# Patient Record
Sex: FEMALE | URBAN - METROPOLITAN AREA
[De-identification: names, ages, dates, MRNs, and addresses within clinical notes are randomized per-mention and may not be internally consistent; named-entity substitution may affect disease eponyms.]

---

## 2023-03-13 ENCOUNTER — APPOINTMENT (RX ONLY)
Dept: URBAN - METROPOLITAN AREA CLINIC 152 | Facility: CLINIC | Age: 43
Setting detail: DERMATOLOGY
End: 2023-03-13

## 2023-03-13 DIAGNOSIS — Q828 OTHER SPECIFIED ANOMALIES OF SKIN: ICD-10-CM

## 2023-03-13 DIAGNOSIS — L82.1 OTHER SEBORRHEIC KERATOSIS: ICD-10-CM

## 2023-03-13 DIAGNOSIS — L30.9 DERMATITIS, UNSPECIFIED: ICD-10-CM

## 2023-03-13 DIAGNOSIS — Q819 OTHER SPECIFIED ANOMALIES OF SKIN: ICD-10-CM

## 2023-03-13 DIAGNOSIS — L73.8 OTHER SPECIFIED FOLLICULAR DISORDERS: ICD-10-CM

## 2023-03-13 DIAGNOSIS — Q826 OTHER SPECIFIED ANOMALIES OF SKIN: ICD-10-CM

## 2023-03-13 DIAGNOSIS — D22 MELANOCYTIC NEVI: ICD-10-CM

## 2023-03-13 DIAGNOSIS — L57.8 OTHER SKIN CHANGES DUE TO CHRONIC EXPOSURE TO NONIONIZING RADIATION: ICD-10-CM

## 2023-03-13 PROBLEM — D22.5 MELANOCYTIC NEVI OF TRUNK: Status: ACTIVE | Noted: 2023-03-13

## 2023-03-13 PROBLEM — L85.8 OTHER SPECIFIED EPIDERMAL THICKENING: Status: ACTIVE | Noted: 2023-03-13

## 2023-03-13 PROCEDURE — ? TREATMENT REGIMEN

## 2023-03-13 PROCEDURE — ? COUNSELING

## 2023-03-13 PROCEDURE — 99203 OFFICE O/P NEW LOW 30 MIN: CPT

## 2023-03-13 ASSESSMENT — LOCATION SIMPLE DESCRIPTION DERM
LOCATION SIMPLE: LEFT POSTERIOR UPPER ARM
LOCATION SIMPLE: LEFT ANTERIOR NECK
LOCATION SIMPLE: RIGHT POSTERIOR UPPER ARM
LOCATION SIMPLE: RIGHT CHEEK
LOCATION SIMPLE: LEFT CHEEK
LOCATION SIMPLE: LEFT UPPER BACK
LOCATION SIMPLE: UPPER BACK

## 2023-03-13 ASSESSMENT — LOCATION ZONE DERM
LOCATION ZONE: NECK
LOCATION ZONE: FACE
LOCATION ZONE: TRUNK
LOCATION ZONE: ARM

## 2023-03-13 ASSESSMENT — LOCATION DETAILED DESCRIPTION DERM
LOCATION DETAILED: RIGHT DISTAL POSTERIOR UPPER ARM
LOCATION DETAILED: LEFT INFERIOR ANTERIOR NECK
LOCATION DETAILED: LEFT SUPERIOR UPPER BACK
LOCATION DETAILED: RIGHT SUPERIOR MEDIAL MALAR CHEEK
LOCATION DETAILED: LEFT CENTRAL MALAR CHEEK
LOCATION DETAILED: INFERIOR THORACIC SPINE
LOCATION DETAILED: LEFT DISTAL POSTERIOR UPPER ARM

## 2023-03-13 NOTE — PROCEDURE: COUNSELING
Detail Level: Detailed
Sunscreen Recommendations: - Rec SPF50+ daily, reapply every 2h
Detail Level: Generalized
Patient Specific Counseling (Will Not Stick From Patient To Patient): -discussed that oculoplastic surgeon could remove SK near right eye
Detail Level: Zone
Patient Specific Counseling (Will Not Stick From Patient To Patient): -advised pt to moisturize QD and avoid overly-drying body wash\\n-recommended Eucerin creamy body wash and Eucerin Advanced Repair Cream\\n-discussed that moisturizers with urea can help resolve roughness

## 2023-03-13 NOTE — HPI: OTHER
Condition:: FBSE
Please Describe Your Condition:: Pt notes lesions of concern below the right eye and on the left cheek.

## 2024-08-06 ENCOUNTER — APPOINTMENT (RX ONLY)
Dept: URBAN - METROPOLITAN AREA CLINIC 152 | Facility: CLINIC | Age: 44
Setting detail: DERMATOLOGY
End: 2024-08-06

## 2024-08-06 DIAGNOSIS — L82.1 OTHER SEBORRHEIC KERATOSIS: ICD-10-CM

## 2024-08-06 DIAGNOSIS — L30.8 OTHER SPECIFIED DERMATITIS: ICD-10-CM

## 2024-08-06 DIAGNOSIS — D22 MELANOCYTIC NEVI: ICD-10-CM

## 2024-08-06 DIAGNOSIS — L57.8 OTHER SKIN CHANGES DUE TO CHRONIC EXPOSURE TO NONIONIZING RADIATION: ICD-10-CM

## 2024-08-06 DIAGNOSIS — L21.8 OTHER SEBORRHEIC DERMATITIS: ICD-10-CM

## 2024-08-06 DIAGNOSIS — L30.4 ERYTHEMA INTERTRIGO: ICD-10-CM

## 2024-08-06 PROBLEM — D22.5 MELANOCYTIC NEVI OF TRUNK: Status: ACTIVE | Noted: 2024-08-06

## 2024-08-06 PROCEDURE — ? TREATMENT REGIMEN

## 2024-08-06 PROCEDURE — ? BENIGN DESTRUCTION COSMETIC

## 2024-08-06 PROCEDURE — ? PRESCRIPTION

## 2024-08-06 PROCEDURE — ? COUNSELING

## 2024-08-06 PROCEDURE — 99214 OFFICE O/P EST MOD 30 MIN: CPT

## 2024-08-06 RX ORDER — ALCLOMETASONE DIPROPIONATE 0.5 MG/G
CREAM TOPICAL
Qty: 60 | Refills: 4 | Status: ERX | COMMUNITY
Start: 2024-08-06

## 2024-08-06 RX ORDER — KETOCONAZOLE 20 MG/ML
SHAMPOO, SUSPENSION TOPICAL
Qty: 120 | Refills: 11 | Status: ERX | COMMUNITY
Start: 2024-08-06

## 2024-08-06 RX ADMIN — KETOCONAZOLE: 20 SHAMPOO, SUSPENSION TOPICAL at 00:00

## 2024-08-06 RX ADMIN — ALCLOMETASONE DIPROPIONATE: 0.5 CREAM TOPICAL at 00:00

## 2024-08-06 ASSESSMENT — LOCATION DETAILED DESCRIPTION DERM
LOCATION DETAILED: SUPERIOR THORACIC SPINE
LOCATION DETAILED: LEFT INFERIOR ANTERIOR NECK
LOCATION DETAILED: RIGHT LATERAL CANTHUS
LOCATION DETAILED: PERIUMBILICAL SKIN
LOCATION DETAILED: LEFT MEDIAL MALAR CHEEK
LOCATION DETAILED: RIGHT SUPERIOR UPPER BACK
LOCATION DETAILED: RIGHT SUPERIOR CENTRAL MALAR CHEEK
LOCATION DETAILED: RIGHT SUPERIOR MEDIAL MALAR CHEEK
LOCATION DETAILED: LEFT LATERAL INFERIOR EYELID

## 2024-08-06 ASSESSMENT — LOCATION SIMPLE DESCRIPTION DERM
LOCATION SIMPLE: RIGHT UPPER BACK
LOCATION SIMPLE: LEFT ANTERIOR NECK
LOCATION SIMPLE: LEFT INFERIOR EYELID
LOCATION SIMPLE: RIGHT EYELID
LOCATION SIMPLE: UPPER BACK
LOCATION SIMPLE: LEFT CHEEK
LOCATION SIMPLE: ABDOMEN
LOCATION SIMPLE: RIGHT CHEEK

## 2024-08-06 ASSESSMENT — LOCATION ZONE DERM
LOCATION ZONE: TRUNK
LOCATION ZONE: EYELID
LOCATION ZONE: NECK
LOCATION ZONE: FACE

## 2024-08-06 NOTE — PROCEDURE: BENIGN DESTRUCTION COSMETIC
Post-Care Instructions: I reviewed with the patient in detail post-care instructions. Patient is to wear sunprotection, and avoid picking at any of the treated lesions. Pt may apply Vaseline to crusted or scabbing areas.
Detail Level: Detailed
Consent: The patient's consent was obtained including but not limited to risks of crusting, scabbing, blistering, scarring, darker or lighter pigmentary change, recurrence, incomplete removal and infection.
Price (Use Numbers Only, No Special Characters Or $): 100.00

## 2024-08-06 NOTE — PROCEDURE: TREATMENT REGIMEN
Addended by: CARLOS ARMSTRONG on: 10/18/2021 05:10 PM     Modules accepted: Orders    
Otc Regimen: Sunscreen SPF50+ daily
Detail Level: Zone

## 2024-08-06 NOTE — HPI: OTHER
Please Describe Your Condition:: Pt here for evaluation of new/changing moles or lesions. Pt has several areas of concern today. Pt is experiencing irritation under the eyes that she states sometimes feels painful. Pt also moles on L and R of face that get irritated and itchy, pt states they may be growing. Pt also has concerns about itchiness to the face and neck when drinking wine, leg chaffing, and flakiness/itchiness on scalp.

## 2024-08-06 NOTE — PROCEDURE: COUNSELING
Detail Level: Generalized
Sunscreen Recommendations: - Rec SPF50+ daily, reapply every 2h
Sunscreen Recommendations: Sunscreen SPF 50+
Detail Level: Detailed
Patient Specific Counseling (Will Not Stick From Patient To Patient): - Discussed etiology and course\\n- Discussed starting Ketoconazole shampoo ~3x a weeks for a few months, then 1x a week for maintenance
Patient Specific Counseling (Will Not Stick From Patient To Patient): - Discussed pt can see Dr. Rivera for removal of SK near eye due to proximity to eye
Patient Specific Counseling (Will Not Stick From Patient To Patient): - Discussed with pt amines in red wine can cause irritation and flushing to skin, advised pt to limit consumption of try white wine
Patient Specific Counseling (Will Not Stick From Patient To Patient): - Discussed etiology and course\\n- Recc’d starting alclometasone 1x a day x 1 week, then stop. \\n- SE’s of topical steroid use discussed, pt understands to take breaks between uses.

## 2025-08-11 ENCOUNTER — APPOINTMENT (OUTPATIENT)
Dept: URBAN - METROPOLITAN AREA CLINIC 152 | Facility: CLINIC | Age: 45
Setting detail: DERMATOLOGY
End: 2025-08-11

## 2025-08-11 DIAGNOSIS — L73.9 FOLLICULAR DISORDER, UNSPECIFIED: ICD-10-CM

## 2025-08-11 DIAGNOSIS — L82.1 OTHER SEBORRHEIC KERATOSIS: ICD-10-CM

## 2025-08-11 DIAGNOSIS — L72.0 EPIDERMAL CYST: ICD-10-CM

## 2025-08-11 DIAGNOSIS — D22 MELANOCYTIC NEVI: ICD-10-CM

## 2025-08-11 DIAGNOSIS — L57.8 OTHER SKIN CHANGES DUE TO CHRONIC EXPOSURE TO NONIONIZING RADIATION: ICD-10-CM

## 2025-08-11 DIAGNOSIS — L50.3 DERMATOGRAPHIC URTICARIA: ICD-10-CM

## 2025-08-11 PROBLEM — D22.5 MELANOCYTIC NEVI OF TRUNK: Status: ACTIVE | Noted: 2025-08-11

## 2025-08-11 PROBLEM — D22.72 MELANOCYTIC NEVI OF LEFT LOWER LIMB, INCLUDING HIP: Status: ACTIVE | Noted: 2025-08-11

## 2025-08-11 PROCEDURE — ? PRESCRIPTION

## 2025-08-11 PROCEDURE — ? OBSERVATION

## 2025-08-11 PROCEDURE — ? COUNSELING

## 2025-08-11 PROCEDURE — ? PATIENT SPECIFIC COUNSELING

## 2025-08-11 PROCEDURE — ? TREATMENT REGIMEN

## 2025-08-11 PROCEDURE — ? PRESCRIPTION MEDICATION MANAGEMENT

## 2025-08-11 RX ORDER — CHLORHEXIDINE GLUCONATE 40 MG/ML
SOLUTION TOPICAL
Qty: 236 | Refills: 11 | Status: ERX | COMMUNITY
Start: 2025-08-11

## 2025-08-11 RX ADMIN — CHLORHEXIDINE GLUCONATE: 40 SOLUTION TOPICAL at 00:00

## 2025-08-11 ASSESSMENT — LOCATION DETAILED DESCRIPTION DERM
LOCATION DETAILED: GLABELLA
LOCATION DETAILED: RIGHT LATERAL INFERIOR EYELID
LOCATION DETAILED: SUPERIOR THORACIC SPINE
LOCATION DETAILED: RIGHT PROXIMAL DORSAL FOREARM
LOCATION DETAILED: LEFT PROXIMAL DORSAL FOREARM
LOCATION DETAILED: RIGHT KNEE
LOCATION DETAILED: LEFT MEDIAL 3RD TOE

## 2025-08-11 ASSESSMENT — LOCATION SIMPLE DESCRIPTION DERM
LOCATION SIMPLE: LEFT 3RD TOE
LOCATION SIMPLE: LEFT FOREARM
LOCATION SIMPLE: RIGHT FOREARM
LOCATION SIMPLE: RIGHT KNEE
LOCATION SIMPLE: RIGHT INFERIOR EYELID
LOCATION SIMPLE: UPPER BACK
LOCATION SIMPLE: GLABELLA

## 2025-08-11 ASSESSMENT — LOCATION ZONE DERM
LOCATION ZONE: TRUNK
LOCATION ZONE: EYELID
LOCATION ZONE: LEG
LOCATION ZONE: ARM
LOCATION ZONE: FACE
LOCATION ZONE: TOE